# Patient Record
Sex: MALE | Race: OTHER | HISPANIC OR LATINO | URBAN - METROPOLITAN AREA
[De-identification: names, ages, dates, MRNs, and addresses within clinical notes are randomized per-mention and may not be internally consistent; named-entity substitution may affect disease eponyms.]

---

## 2022-05-18 ENCOUNTER — EMERGENCY (EMERGENCY)
Facility: HOSPITAL | Age: 67
LOS: 1 days | Discharge: HOME | End: 2022-05-18
Attending: EMERGENCY MEDICINE
Payer: MEDICARE

## 2022-05-18 VITALS
DIASTOLIC BLOOD PRESSURE: 87 MMHG | RESPIRATION RATE: 18 BRPM | WEIGHT: 177.91 LBS | TEMPERATURE: 99 F | HEART RATE: 97 BPM | HEIGHT: 67 IN | SYSTOLIC BLOOD PRESSURE: 146 MMHG | OXYGEN SATURATION: 94 %

## 2022-05-18 VITALS
HEART RATE: 70 BPM | TEMPERATURE: 98 F | OXYGEN SATURATION: 97 % | DIASTOLIC BLOOD PRESSURE: 74 MMHG | RESPIRATION RATE: 18 BRPM | SYSTOLIC BLOOD PRESSURE: 121 MMHG

## 2022-05-18 LAB
ALBUMIN SERPL ELPH-MCNC: 4.3 G/DL — SIGNIFICANT CHANGE UP (ref 3.3–5)
ALP SERPL-CCNC: 58 U/L — SIGNIFICANT CHANGE UP (ref 40–120)
ALT FLD-CCNC: <5 U/L — LOW (ref 10–45)
ANION GAP SERPL CALC-SCNC: 14 MMOL/L — SIGNIFICANT CHANGE UP (ref 5–17)
AST SERPL-CCNC: 11 U/L — SIGNIFICANT CHANGE UP (ref 10–40)
BASOPHILS # BLD AUTO: 0.05 K/UL — SIGNIFICANT CHANGE UP (ref 0–0.2)
BASOPHILS NFR BLD AUTO: 0.5 % — SIGNIFICANT CHANGE UP (ref 0–2)
BILIRUB SERPL-MCNC: 0.5 MG/DL — SIGNIFICANT CHANGE UP (ref 0.2–1.2)
BUN SERPL-MCNC: 18 MG/DL — SIGNIFICANT CHANGE UP (ref 7–23)
CALCIUM SERPL-MCNC: 9.4 MG/DL — SIGNIFICANT CHANGE UP (ref 8.4–10.5)
CHLORIDE SERPL-SCNC: 102 MMOL/L — SIGNIFICANT CHANGE UP (ref 96–108)
CO2 SERPL-SCNC: 20 MMOL/L — LOW (ref 22–31)
CREAT SERPL-MCNC: 0.52 MG/DL — SIGNIFICANT CHANGE UP (ref 0.5–1.3)
EGFR: 111 ML/MIN/1.73M2 — SIGNIFICANT CHANGE UP
EOSINOPHIL # BLD AUTO: 0.21 K/UL — SIGNIFICANT CHANGE UP (ref 0–0.5)
EOSINOPHIL NFR BLD AUTO: 2.3 % — SIGNIFICANT CHANGE UP (ref 0–6)
GLUCOSE SERPL-MCNC: 146 MG/DL — HIGH (ref 70–99)
HCT VFR BLD CALC: 44.4 % — SIGNIFICANT CHANGE UP (ref 39–50)
HGB BLD-MCNC: 14.1 G/DL — SIGNIFICANT CHANGE UP (ref 13–17)
IMM GRANULOCYTES NFR BLD AUTO: 0.8 % — SIGNIFICANT CHANGE UP (ref 0–1.5)
LYMPHOCYTES # BLD AUTO: 1.77 K/UL — SIGNIFICANT CHANGE UP (ref 1–3.3)
LYMPHOCYTES # BLD AUTO: 19.4 % — SIGNIFICANT CHANGE UP (ref 13–44)
MCHC RBC-ENTMCNC: 28.7 PG — SIGNIFICANT CHANGE UP (ref 27–34)
MCHC RBC-ENTMCNC: 31.8 GM/DL — LOW (ref 32–36)
MCV RBC AUTO: 90.4 FL — SIGNIFICANT CHANGE UP (ref 80–100)
MONOCYTES # BLD AUTO: 0.6 K/UL — SIGNIFICANT CHANGE UP (ref 0–0.9)
MONOCYTES NFR BLD AUTO: 6.6 % — SIGNIFICANT CHANGE UP (ref 2–14)
NEUTROPHILS # BLD AUTO: 6.41 K/UL — SIGNIFICANT CHANGE UP (ref 1.8–7.4)
NEUTROPHILS NFR BLD AUTO: 70.4 % — SIGNIFICANT CHANGE UP (ref 43–77)
NRBC # BLD: 0 /100 WBCS — SIGNIFICANT CHANGE UP (ref 0–0)
PLATELET # BLD AUTO: 228 K/UL — SIGNIFICANT CHANGE UP (ref 150–400)
POTASSIUM SERPL-MCNC: 4.6 MMOL/L — SIGNIFICANT CHANGE UP (ref 3.5–5.3)
POTASSIUM SERPL-SCNC: 4.6 MMOL/L — SIGNIFICANT CHANGE UP (ref 3.5–5.3)
PROT SERPL-MCNC: 7.4 G/DL — SIGNIFICANT CHANGE UP (ref 6–8.3)
RBC # BLD: 4.91 M/UL — SIGNIFICANT CHANGE UP (ref 4.2–5.8)
RBC # FLD: 13.6 % — SIGNIFICANT CHANGE UP (ref 10.3–14.5)
SODIUM SERPL-SCNC: 136 MMOL/L — SIGNIFICANT CHANGE UP (ref 135–145)
WBC # BLD: 9.11 K/UL — SIGNIFICANT CHANGE UP (ref 3.8–10.5)
WBC # FLD AUTO: 9.11 K/UL — SIGNIFICANT CHANGE UP (ref 3.8–10.5)

## 2022-05-18 PROCEDURE — 99284 EMERGENCY DEPT VISIT MOD MDM: CPT | Mod: FS

## 2022-05-18 PROCEDURE — 36415 COLL VENOUS BLD VENIPUNCTURE: CPT

## 2022-05-18 PROCEDURE — 99284 EMERGENCY DEPT VISIT MOD MDM: CPT | Mod: 25

## 2022-05-18 PROCEDURE — 70481 CT ORBIT/EAR/FOSSA W/DYE: CPT | Mod: MA

## 2022-05-18 PROCEDURE — 70481 CT ORBIT/EAR/FOSSA W/DYE: CPT | Mod: 26,MA

## 2022-05-18 PROCEDURE — 80053 COMPREHEN METABOLIC PANEL: CPT

## 2022-05-18 PROCEDURE — 85025 COMPLETE CBC W/AUTO DIFF WBC: CPT

## 2022-05-18 PROCEDURE — 85652 RBC SED RATE AUTOMATED: CPT

## 2022-05-18 RX ORDER — OFLOXACIN 0.3 %
1 DROPS OPHTHALMIC (EYE) ONCE
Refills: 0 | Status: COMPLETED | OUTPATIENT
Start: 2022-05-18 | End: 2022-05-18

## 2022-05-18 RX ORDER — ERYTHROMYCIN BASE 5 MG/GRAM
1 OINTMENT (GRAM) OPHTHALMIC (EYE) ONCE
Refills: 0 | Status: COMPLETED | OUTPATIENT
Start: 2022-05-18 | End: 2022-05-18

## 2022-05-18 RX ADMIN — Medication 1 DROP(S): at 18:37

## 2022-05-18 RX ADMIN — Medication 1 APPLICATION(S): at 18:37

## 2022-05-18 NOTE — ED PROVIDER NOTE - NS ED ATTENDING STATEMENT MOD
I have seen and examined this patient and fully participated in the care of this patient as the teaching attending.  The service was shared with the CO[facility].  I reviewed and verified the documentation and independently performed the documented:

## 2022-05-18 NOTE — ED CLERICAL - NS ED CLERK NOTE PRE-ARRIVAL INFORMATION; ADDITIONAL PRE-ARRIVAL INFORMATION
CC/Reason For referral: visiting from out of town, recently saw own eye MD - c/o redness and now vision loss - left eye 20/200  Preferred Consultant(if applicable):  Who admits for you (if needed):  Do you have documents you would like to fax over? NO  Would you still like to speak to an ED attending? NO

## 2022-05-18 NOTE — ED PROVIDER NOTE - NSFOLLOWUPINSTRUCTIONS_ED_ALL_ED_FT
Thank you for visiting our Emergency Department, it has been a pleasure taking part in your healthcare. Please follow up with your primary doctor within x48 hours.    Your discharge diagnosis is: Left eye conjunctivitis    Return precautions to the Emergency Department include but are not limited to: unrelenting nausea, vomiting, fever, chills, chest pain, shortness of breath, dizziness, abdominal pain, worsening pain, syncope, blood in urine or stool, headache that doesn't resolve, numbness or tingling, loss of sensation, loss of motor function, or any other concerning symptoms.     Erythromycin ointment twice daily to left eye  Oflox eye drops to left eye  4x a day for 7 days     Follow up with the opthalmology clinic [phone] in the next week

## 2022-05-18 NOTE — ED PROVIDER NOTE - PATIENT PORTAL LINK FT
You can access the FollowMyHealth Patient Portal offered by [facility] by registering at the following website: [email or web address]. By joining [facility]’s FollowMyHealth portal, you will also be able to view your health information using other applications (apps) compatible with our system.

## 2022-05-18 NOTE — ED PROVIDER NOTE - PROGRESS NOTE DETAILS
Kofi Forde NP-C - Pt's labs normal and CT unremarkable.   Pt seen and evaluated by opthalmology- Pt to dc home with erythro ointment and oflox  follow up at home in [place]

## 2022-05-18 NOTE — ED PROVIDER NOTE - OBJECTIVE STATEMENT
67 yo male in eye room presents to the ER for evaluation of left eye discharge and change in vision.   Pt awake alert oriented x3 states "I have had this eye discharge for about 5 days and my doctor gave me natural tears and the infection only got worse and today I went to urgent care and they sent me to the ER".  Pt reports decreased vision only in the left eye and normal vision in the right eye with purulent yellow green discharge.   DEnies fevers and chills at this time.

## 2022-05-18 NOTE — ED PROVIDER NOTE - CLINICAL SUMMARY MEDICAL DECISION MAKING FREE TEXT BOX
sent from urgent care for left eye purulent drainage and vision change and redness, labs, ct orbit, jesús pen , optho consult?? sent from urgent care for left eye purulent drainage and vision change and redness, labs, ct orbit, jesús pen , optho consult    Attending Statement: Agree with the above.  L eye injection, discomfort, decreased vision and purulent d/c x 5 days.  Taking visene or similar drop, not on abx; no f/c, no facial pain, no HA.  Exam c L eye injection to sclera, painless EOM, 20/100 (20/50 OD), IOP 10, +purulent drainage and mild swelling to medial canthus.  Overall clinical picture seems c/w conjunctivitis though dm2, vision loss, injection/dc is somewhat concerning for endophthalmitis.  No h/o trauma, no e/o FB.  No concern for open globe.  Will need imaging, labs, likely ophtho c/s as above.  Reassess.  --BMM

## 2022-05-18 NOTE — CONSULT NOTE ADULT - SUBJECTIVE AND OBJECTIVE BOX
[facility] DEPARTMENT OF OPHTHALMOLOGY - INITIAL ADULT CONSULT  -----------------------------------------------------------------------------  Sergey Woodall MD PGY-3  -----------------------------------------------------------------------------    HPI: 65 YO M PMH HTN, SHALONDA and ophthalmology consulted for left eye discharge. Pt states it started several days ago and has become copious to the point where he is having blurry vision OS. Pt endorses rednesss, FBS, mucopurulent discharge OS.      Interval History: ***    PMH: as above  POcHx: denies surg/laser  FH: denies glc/amd  Social History: denies etoh/tobacco  Ophthalmic Medications: none  Allergies: NKDA    Review of Systems:  Constitutional: No fever, chills  Eyes: +blurry vision, discharge OS. No , flashes, floaters, FBS, erythema, , double vision, OU  Neuro: No tremors  Cardiovascular: No chest pain, palpitations  Respiratory: No SOB, no cough  GI: No nausea, vomiting, abdominal pain  : No dysuria  Skin: no rash  Psych: no depression  Endocrine: no polyuria, polydipsia  Heme/lymph: no swelling    VITALS: T(C): 36.6 (05-18-22 @ 18:00)  T(F): 97.9 (05-18-22 @ 18:00), Max: 98.8 (05-18-22 @ 13:39)  HR: 70 (05-18-22 @ 18:00) (70 - 97)  BP: 121/74 (05-18-22 @ 18:00) (121/74 - 146/87)  RR:  (18 - 18)  SpO2:  (94% - 97%)  Wt(kg): --  General: AAO x 3, appropriate mood and affect    Ophthalmology Exam:  Visual acuity (cc): 20/20 OD and 20/25 OS  Pupils: PERRL OU, no APD  Ttono: 17, 15   Extraocular movements (EOMs): Full OU, no pain, no diplopia  Confrontational Visual Field (CVF): Full OU  Color Plates: 12/12 OU    Slit Lamp Exam (SLE)  External: Flat OU. No palpable preauricular node OU  Lids/Lashes/Lacrimal Ducts: anterior blepharitis OU with punctal plug in inferior punctum OD and contracted punctum OS  Sclera/Conjunctiva: W+Q OD and +1 injection OS  Cornea: Cl OU  Anterior Chamber: D+F OU    Iris: Flat OU  Lens: NS OU    Fundus Exam: dilated with 1% tropicamide and 2.5% phenylephrine  Approval obtained from primary team for dilation  Patient aware that pupils can remained dilated for at least 4-6 hours  Exam performed with 20D lens    Vitreous: wnl OU  Disc, cup/disc: sharp and pink, 0.7 OU  Macula: wnl OU  Vessels: wnl OU  Periphery: wnl OU

## 2022-05-18 NOTE — ED PROVIDER NOTE - NOTES
spoke with back up opthalmology resident after attempting ot get in contact with 9-5 resident.  awaiting call back from resident who will consult on pt-  optho aware of pt and condition

## 2022-05-18 NOTE — ED ADULT NURSE NOTE - OBJECTIVE STATEMENT
Ambulatory, well-appearing patient in no apparent distress complains of left eye discharge.  Pt reports 5 days of eye redness, yellow puss discharge and left eye blurry vision.  Seen by doctor in [place] 5 days ago and given artificial tears.  [place] [facility] [place].  No eye trauma, no eye pain with extraocular movements.  Pt conversive no fevers, chills.

## 2022-05-19 LAB — ERYTHROCYTE [SEDIMENTATION RATE] IN BLOOD: 13 MM/HR — SIGNIFICANT CHANGE UP (ref 0–20)
